# Patient Record
Sex: FEMALE | Race: WHITE | NOT HISPANIC OR LATINO | Employment: FULL TIME | URBAN - NONMETROPOLITAN AREA
[De-identification: names, ages, dates, MRNs, and addresses within clinical notes are randomized per-mention and may not be internally consistent; named-entity substitution may affect disease eponyms.]

---

## 2023-03-20 LAB
ABO GROUP (TYPE) IN BLOOD: NORMAL
ANTIBODY SCREEN: NORMAL
BASOPHILS (10*3/UL) IN BLOOD BY AUTOMATED COUNT: 0.03 X10E9/L (ref 0–0.1)
BASOPHILS/100 LEUKOCYTES IN BLOOD BY AUTOMATED COUNT: 0.4 % (ref 0–2)
EOSINOPHILS (10*3/UL) IN BLOOD BY AUTOMATED COUNT: 0.1 X10E9/L (ref 0–0.7)
EOSINOPHILS/100 LEUKOCYTES IN BLOOD BY AUTOMATED COUNT: 1.3 % (ref 0–6)
ERYTHROCYTE DISTRIBUTION WIDTH (RATIO) BY AUTOMATED COUNT: 12.7 % (ref 11.5–14.5)
ERYTHROCYTE MEAN CORPUSCULAR HEMOGLOBIN CONCENTRATION (G/DL) BY AUTOMATED: 34.2 G/DL (ref 32–36)
ERYTHROCYTE MEAN CORPUSCULAR VOLUME (FL) BY AUTOMATED COUNT: 92 FL (ref 80–100)
ERYTHROCYTES (10*6/UL) IN BLOOD BY AUTOMATED COUNT: 4.24 X10E12/L (ref 4–5.2)
HEMATOCRIT (%) IN BLOOD BY AUTOMATED COUNT: 38.9 % (ref 36–46)
HEMOGLOBIN (G/DL) IN BLOOD: 13.3 G/DL (ref 12–16)
HEPATITIS B VIRUS SURFACE AG PRESENCE IN SERUM: NONREACTIVE
HIV 1/ 2 AG/AB SCREEN: NONREACTIVE
IMMATURE GRANULOCYTES/100 LEUKOCYTES IN BLOOD BY AUTOMATED COUNT: 0.3 % (ref 0–0.9)
LEUKOCYTES (10*3/UL) IN BLOOD BY AUTOMATED COUNT: 7.9 X10E9/L (ref 4.4–11.3)
LYMPHOCYTES (10*3/UL) IN BLOOD BY AUTOMATED COUNT: 1.73 X10E9/L (ref 1.2–4.8)
LYMPHOCYTES/100 LEUKOCYTES IN BLOOD BY AUTOMATED COUNT: 22 % (ref 13–44)
MONOCYTES (10*3/UL) IN BLOOD BY AUTOMATED COUNT: 0.68 X10E9/L (ref 0.1–1)
MONOCYTES/100 LEUKOCYTES IN BLOOD BY AUTOMATED COUNT: 8.7 % (ref 2–10)
NEUTROPHILS (10*3/UL) IN BLOOD BY AUTOMATED COUNT: 5.29 X10E9/L (ref 1.2–7.7)
NEUTROPHILS/100 LEUKOCYTES IN BLOOD BY AUTOMATED COUNT: 67.3 % (ref 40–80)
PLATELETS (10*3/UL) IN BLOOD AUTOMATED COUNT: 220 X10E9/L (ref 150–450)
RH FACTOR: NORMAL
RUBELLA VIRUS IGG AB: POSITIVE
SYPHILIS TOTAL AB: NONREACTIVE

## 2023-03-21 LAB
CHLAMYDIA TRACH., AMPLIFIED: NEGATIVE
N. GONORRHEA, AMPLIFIED: NEGATIVE
URINE CULTURE: NO GROWTH

## 2023-04-20 LAB — LAB MOLECULAR CA TECHNICAL NOTES: NORMAL

## 2023-05-19 LAB
AMPHETAMINE (PRESENCE) IN URINE BY SCREEN METHOD: NORMAL
BARBITURATES PRESENCE IN URINE BY SCREEN METHOD: NORMAL
BENZODIAZEPINE (PRESENCE) IN URINE BY SCREEN METHOD: NORMAL
CANNABINOIDS IN URINE BY SCREEN METHOD: NORMAL
COCAINE (PRESENCE) IN URINE BY SCREEN METHOD: NORMAL
DRUG SCREEN COMMENT URINE: NORMAL
FENTANYL URINE: NORMAL
METHADONE (PRESENCE) IN URINE BY SCREEN METHOD: NORMAL
OPIATES (PRESENCE) IN URINE BY SCREEN METHOD: NORMAL
OXYCODONE (PRESENCE) IN URINE BY SCREEN METHOD: NORMAL
PHENCYCLIDINE (PRESENCE) IN URINE BY SCREEN METHOD: NORMAL

## 2023-07-07 LAB
ERYTHROCYTE DISTRIBUTION WIDTH (RATIO) BY AUTOMATED COUNT: 12.8 % (ref 11.5–14.5)
ERYTHROCYTE MEAN CORPUSCULAR HEMOGLOBIN CONCENTRATION (G/DL) BY AUTOMATED: 34.4 G/DL (ref 32–36)
ERYTHROCYTE MEAN CORPUSCULAR VOLUME (FL) BY AUTOMATED COUNT: 96 FL (ref 80–100)
ERYTHROCYTES (10*6/UL) IN BLOOD BY AUTOMATED COUNT: 3.99 X10E12/L (ref 4–5.2)
GLUCOSE, 1 HR SCREEN, PREG: 91 MG/DL
HEMATOCRIT (%) IN BLOOD BY AUTOMATED COUNT: 38.1 % (ref 36–46)
HEMOGLOBIN (G/DL) IN BLOOD: 13.1 G/DL (ref 12–16)
LEUKOCYTES (10*3/UL) IN BLOOD BY AUTOMATED COUNT: 8.2 X10E9/L (ref 4.4–11.3)
PLATELETS (10*3/UL) IN BLOOD AUTOMATED COUNT: 190 X10E9/L (ref 150–450)
REFLEX ADDED, ANEMIA PANEL: ABNORMAL

## 2023-09-04 LAB — GROUP B STREP SCREEN: NORMAL

## 2023-09-26 PROBLEM — B96.89 BACTERIAL VAGINOSIS: Status: ACTIVE | Noted: 2023-09-26

## 2023-09-26 PROBLEM — G43.109 VERTEBROBASILAR MIGRAINE: Status: ACTIVE | Noted: 2021-03-30

## 2023-09-26 PROBLEM — Z3A.38 38 WEEKS GESTATION OF PREGNANCY (HHS-HCC): Status: ACTIVE | Noted: 2023-09-26

## 2023-09-26 PROBLEM — N97.0 INFERTILITY, ANOVULATION: Status: ACTIVE | Noted: 2023-09-26

## 2023-09-26 PROBLEM — F41.0 PANIC ATTACK: Status: ACTIVE | Noted: 2023-09-26

## 2023-09-26 PROBLEM — N76.0 BACTERIAL VAGINOSIS: Status: ACTIVE | Noted: 2023-09-26

## 2023-09-26 PROBLEM — F41.9 ANXIETY: Status: ACTIVE | Noted: 2023-09-26

## 2023-09-26 PROBLEM — O03.9 SPONTANEOUS MISCARRIAGE (HHS-HCC): Status: ACTIVE | Noted: 2023-09-26

## 2023-09-26 PROBLEM — N89.8 LEUKORRHEA: Status: ACTIVE | Noted: 2023-09-26

## 2023-09-26 RX ORDER — CLONAZEPAM 0.5 MG/1
0.25 TABLET ORAL 3 TIMES DAILY PRN
COMMUNITY
Start: 2021-03-01 | End: 2023-10-02 | Stop reason: ALTCHOICE

## 2023-09-26 RX ORDER — LANOLIN ALCOHOL/MO/W.PET/CERES
400 CREAM (GRAM) TOPICAL 2 TIMES DAILY
COMMUNITY
Start: 2023-03-20 | End: 2023-10-02 | Stop reason: ALTCHOICE

## 2023-09-26 RX ORDER — SUMATRIPTAN 50 MG/1
50 TABLET, FILM COATED ORAL EVERY 2 HOUR PRN
COMMUNITY
Start: 2021-03-30 | End: 2023-10-02 | Stop reason: ALTCHOICE

## 2023-09-26 RX ORDER — CITALOPRAM 10 MG/1
10 TABLET ORAL DAILY
COMMUNITY
Start: 2021-03-01 | End: 2023-10-02 | Stop reason: ALTCHOICE

## 2023-10-02 ENCOUNTER — OFFICE VISIT (OUTPATIENT)
Dept: OBSTETRICS AND GYNECOLOGY | Facility: CLINIC | Age: 23
End: 2023-10-02
Payer: COMMERCIAL

## 2023-10-02 VITALS
BODY MASS INDEX: 33.06 KG/M2 | WEIGHT: 186.6 LBS | SYSTOLIC BLOOD PRESSURE: 122 MMHG | DIASTOLIC BLOOD PRESSURE: 68 MMHG | HEIGHT: 63 IN

## 2023-10-02 DIAGNOSIS — Z09 POSTOP CHECK: Primary | ICD-10-CM

## 2023-10-02 PROCEDURE — 99213 OFFICE O/P EST LOW 20 MIN: CPT | Performed by: OBSTETRICS & GYNECOLOGY

## 2023-10-02 NOTE — PROGRESS NOTES
Postpartum Progress Note    Assessment/Plan   Demetrice Benton is a 23 y.o., , who delivered at Unknown gestation and is now postpartum day .  Demetrice is doing well postop no issues or concerns follow-up in 5 weeks for postpartum release      Active Problems:  There are no active Hospital Problems.    Problem List       No episode was linked to this visit.          Hospital course: no complications       Subjective   Her pain is well controlled with current medications  She is passing flatus  She is ambulating well  She is tolerating a No diet orders on file  She reports no breast or nursing problems  She denies emotional concerns today   Her plan for contraception is none       Demetrice is status post a primary  for breech 10 days ago.  She was discharged home 2 days after delivery.  She is doing well is breast-feeding.  She reports that the baby is sleeping well and she has to awaken the baby to eat.  She says the breast milk has come in better on one side than the other but she is actively pumping.    Objective   Allergies:   Amoxicillin         Last Vitals:  Temp Pulse Resp BP MAP Pulse Ox         122/68         Vitals Min/Max Last 24 Hours:  @FLOWSTAT(6,8,9,5,[id number]:24::1)@    Intake/Output:   [unfilled]    Physical Exam:  Sitting in bed comfortably in no acute distress  Respiratory breathing comfortably  Abdomen nontender incision clean intact with glue present through an entire incision  GYN pelvic exam not indicated  Musculoskeletal good mobility  Psych appropriate     Lab Data:  Labs in chart were reviewed.

## 2023-11-06 ENCOUNTER — OFFICE VISIT (OUTPATIENT)
Dept: OBSTETRICS AND GYNECOLOGY | Facility: CLINIC | Age: 23
End: 2023-11-06
Payer: COMMERCIAL

## 2023-11-06 VITALS
DIASTOLIC BLOOD PRESSURE: 62 MMHG | HEIGHT: 63 IN | BODY MASS INDEX: 31.94 KG/M2 | WEIGHT: 180.28 LBS | SYSTOLIC BLOOD PRESSURE: 110 MMHG

## 2023-11-06 NOTE — PROGRESS NOTES
Subjective   Patient ID: Demetrice Benton is a 23 y.o. female who presents for Postpartum Care (Patient is here for a 5 week post partum release. Patient is having no issues. She's breastfeeding. Does not want to be put on BC. ).  HPI  Demetrice is 6 weeks postpartum.  She is breast-feeding.  She reports that she has resumed driving down to almost all her regular activities.  She is tolerating diet well and has no issues or concerns      Objective   Physical Exam  Pleasant healthy in no acute distress  Breathing comfortably  Incision clean dry healing well  Extremities good mobility    Assessment/Plan     Darian is here for a post  postpartum visit.  She is doing well declines contraception for the time being she needs to be seen back for routine annual in 6 months

## 2024-05-10 ENCOUNTER — APPOINTMENT (OUTPATIENT)
Dept: OBSTETRICS AND GYNECOLOGY | Facility: CLINIC | Age: 24
End: 2024-05-10
Payer: COMMERCIAL